# Patient Record
Sex: FEMALE | Race: AMERICAN INDIAN OR ALASKA NATIVE
[De-identification: names, ages, dates, MRNs, and addresses within clinical notes are randomized per-mention and may not be internally consistent; named-entity substitution may affect disease eponyms.]

---

## 2017-10-04 ENCOUNTER — HOSPITAL ENCOUNTER (EMERGENCY)
Dept: HOSPITAL 5 - ED | Age: 22
LOS: 1 days | Discharge: LEFT BEFORE DISCHARGE | End: 2017-10-05
Payer: SELF-PAY

## 2017-10-04 DIAGNOSIS — R10.9: Primary | ICD-10-CM

## 2017-10-04 DIAGNOSIS — Z53.21: ICD-10-CM

## 2017-10-04 PROCEDURE — 84702 CHORIONIC GONADOTROPIN TEST: CPT

## 2017-10-04 PROCEDURE — 36415 COLL VENOUS BLD VENIPUNCTURE: CPT

## 2017-10-04 PROCEDURE — 86850 RBC ANTIBODY SCREEN: CPT

## 2017-10-04 PROCEDURE — 86901 BLOOD TYPING SEROLOGIC RH(D): CPT

## 2017-10-04 PROCEDURE — 81001 URINALYSIS AUTO W/SCOPE: CPT

## 2017-10-04 PROCEDURE — 86900 BLOOD TYPING SEROLOGIC ABO: CPT

## 2017-10-04 PROCEDURE — 85025 COMPLETE CBC W/AUTO DIFF WBC: CPT

## 2017-10-05 VITALS — SYSTOLIC BLOOD PRESSURE: 117 MMHG | DIASTOLIC BLOOD PRESSURE: 72 MMHG

## 2017-10-05 LAB
BASOPHILS NFR BLD AUTO: 0.3 % (ref 0–1.8)
BILIRUB UR QL STRIP: (no result)
BLOOD UR QL VISUAL: (no result)
EOSINOPHIL NFR BLD AUTO: 0.3 % (ref 0–4.3)
HCT VFR BLD CALC: 36.6 % (ref 30.3–42.9)
HGB BLD-MCNC: 11.9 GM/DL (ref 10.1–14.3)
KETONES UR STRIP-MCNC: (no result) MG/DL
LEUKOCYTE ESTERASE UR QL STRIP: (no result)
MCH RBC QN AUTO: 28 PG (ref 28–32)
MCHC RBC AUTO-ENTMCNC: 33 % (ref 30–34)
MCV RBC AUTO: 85 FL (ref 79–97)
MUCOUS THREADS #/AREA URNS HPF: (no result) /HPF
NITRITE UR QL STRIP: (no result)
PH UR STRIP: 6 [PH] (ref 5–7)
PLATELET # BLD: 350 K/MM3 (ref 140–440)
RBC # BLD AUTO: 4.31 M/MM3 (ref 3.65–5.03)
RBC #/AREA URNS HPF: 2 /HPF (ref 0–6)
UROBILINOGEN UR-MCNC: < 2 MG/DL (ref ?–2)
WBC # BLD AUTO: 8.4 K/MM3 (ref 4.5–11)
WBC #/AREA URNS HPF: 1 /HPF (ref 0–6)

## 2018-05-27 ENCOUNTER — HOSPITAL ENCOUNTER (EMERGENCY)
Dept: HOSPITAL 5 - ED | Age: 23
Discharge: HOME | End: 2018-05-27
Payer: MEDICAID

## 2018-05-27 VITALS — SYSTOLIC BLOOD PRESSURE: 160 MMHG | DIASTOLIC BLOOD PRESSURE: 76 MMHG

## 2018-05-27 DIAGNOSIS — S41.151A: ICD-10-CM
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
## 2018-05-27 DIAGNOSIS — Z3A.16: ICD-10-CM

## 2018-05-27 DIAGNOSIS — O9A.212: Primary | ICD-10-CM
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
## 2018-05-27 PROCEDURE — 90471 IMMUNIZATION ADMIN: CPT

## 2018-05-27 PROCEDURE — 90715 TDAP VACCINE 7 YRS/> IM: CPT

## 2018-05-27 RX ADMIN — LIDOCAINE HYDROCHLORIDE ONE ML: 20 INJECTION, SOLUTION INFILTRATION; PERINEURAL at 20:53

## 2018-05-27 RX ADMIN — LIDOCAINE HYDROCHLORIDE ONE: 20 INJECTION, SOLUTION INFILTRATION; PERINEURAL at 20:54

## 2018-05-27 NOTE — EMERGENCY DEPARTMENT REPORT
ED Animal Bite HPI





- General


Chief Complaint: Animal Bite


Stated Complaint: DOG BITE


Time Seen by Provider: 18 19:01


Source: patient


Mode of arrival: Ambulatory


Limitations: No Limitations





- History of Present Illness


Initial Comments: 





This is a 22-year-old -American female presents with a dog bite.  

Patient reports she is 4 months pregnant and her personal dog bit her multiple 

times to both arms and under left breast.  Patient reports dog has been very 

aggressive since pregnancy.  He is currently up-to-date on vaccines.  For a 

friend was able removed from her but received multiple bites as well.  Last 

menstrual period was 2018.   A2.  Patient and spouse reports dog 

remains in home 90% of the time.  He is only outside when walked.  Do not think 

he has rabies.  Denies swelling, redness, and numbness or tingling.


MD Complaint: animal bite


-: This afternoon (1-2 hours ago)


Location: other (abrasions and puncture wound under left breast)


Left: Forearm (multiple puncture wounds to bilateral wrists and forearms), Right

: Forearm


Animal: dog


Animal Control Notified: No


Description: household pet, immunizations UTD


Mechanism: bite


Pain Description: sharp, constant


Severity scale (0 -10): 7


Context: unprovoked


Associated Symptoms: erythema, bleeding.  denies: discharge from wound, fever, 

chills, rash, loss of consciousness, cough, headache, diaphoresis, shortness of 

breath


Treatments Prior to Arrival: pressure





- Related Data


Patient Tetanus UTD: No


 Previous Rx's











 Medication  Instructions  Recorded  Last Taken  Type


 


Amoxicillin/Potassium Clav 1 each PO BID 5 Days #10 tablet 18 Unknown Rx





[Augmentin 875-125 Tablet]    











 Allergies











Allergy/AdvReac Type Severity Reaction Status Date / Time


 


codeine Allergy  Hives Verified 10/05/17 00:19


 


morphine Allergy  Hives Verified 18 17:46














ED Review of Systems


ROS: 


Stated complaint: DOG BITE


Other details as noted in HPI





Constitutional: denies: chills, fever


Respiratory: denies: cough, shortness of breath, wheezing


Cardiovascular: denies: chest pain, palpitations


Gastrointestinal: denies: abdominal pain, nausea, diarrhea


Musculoskeletal: denies: back pain, joint swelling, arthralgia


Skin: lesions (mode multiple puncture wounds to the bilateral wrist, forearms 

and under left breast).  denies: rash


Neurological: denies: headache, weakness, numbness, paresthesias


Psychiatric: denies: anxiety, depression





ED Past Medical Hx





- Past Medical History


Previous Medical History?: No


Additional medical history: MISCARRIAGE 2017





- Surgical History


Past Surgical History?: No





- Social History


Smoking Status: Never Smoker


Substance Use Type: None





- Medications


Home Medications: 


 Home Medications











 Medication  Instructions  Recorded  Confirmed  Last Taken  Type


 


Amoxicillin/Potassium Clav 1 each PO BID 5 Days #10 tablet 18  Unknown Rx





[Augmentin 875-125 Tablet]     














ED Physical Exam





- General


Limitations: No Limitations


General appearance: alert, in no apparent distress





- Respiratory


Respiratory exam: Present: normal lung sounds bilaterally.  Absent: respiratory 

distress





- Cardiovascular


Cardiovascular Exam: Present: regular rate, normal rhythm, normal heart sounds.

  Absent: systolic murmur, diastolic murmur, rubs, gallop





- GI/Abdominal


GI/Abdominal exam: Present: soft, normal bowel sounds





- Extremities Exam


Extremities exam: Present: normal inspection, full ROM, normal capillary refill





- Expanded Upper Extremity Exam


  ** Right


Shoulder Exam: Present: normal inspection, full ROM


Upper Arm exam: Present: full ROM, tenderness, abrasion (multiple abrasions to 

anterior forearm)


Elbow exam: Present: normal inspection, full ROM


Forearm Wrist exam: Present: tenderness, abrasion.  Absent: deformity, crepidus

, dislocation, tenderness over anatomical snuff box, pain with axial thumb 

loading


Hand Wrist exam: Present: normal inspection, full ROM


Neuro motor exam: Present: wrist extension intact, thumb opposition intact, 

thumb IP flexion intact, thumb adduction intact, fingers 2-5 abduction intact


Neurosensory exam: Present: radial nerve intact, ulnar nerve intact, median 

nerve intact


Vascular: Present: normal capillary refill, radial pulse (+2)





  ** Left


Shoulder Exam: Present: normal inspection, full ROM


Upper Arm exam: Present: normal inspection, full ROM


Elbow exam: Present: normal inspection, full ROM


Forearm Wrist exam: Present: full ROM, tenderness, abrasion (multiple abrasions 

anterior forearm), laceration (2 cm laceration to left wrist, bloody discharge, 

no surrounding cellulitis, tenderness).  Absent: ecchymosis, deformity, crepidus

, dislocation, erythema


Hand Wrist exam: Present: full ROM, tenderness, other


Neuro motor exam: Present: wrist extension intact, thumb opposition intact, 

thumb IP flexion intact, thumb adduction intact, fingers 2-5 abduction intact


Neurosensory exam: Present: radial nerve intact, ulnar nerve intact, median 

nerve intact


Vascular: Present: normal capillary refill, radial pulse (+2)





- Neurological Exam


Neurological exam: Present: alert, oriented X3, normal gait





- Psychiatric


Psychiatric exam: Present: normal affect, normal mood





- Skin


Skin exam: Present: warm, dry, normal color, other (puncture wound to left 

breast and 7 o'clock, bloody drainage, tender to touch, and no surrounding 

cellulitis).  Absent: intact, rash





ED Course


 Vital Signs











  18





  17:46 21:55


 


Temperature 98.4 F 


 


Pulse Rate 113 H 88


 


Respiratory  17





Rate  


 


Blood Pressure 160/76 


 


O2 Sat by Pulse 100 98





Oximetry  














- Laceration /Wound Repair


  ** Left Anterior Distal Arm


Wound Location: upper extremity


Wound Length (cm): 2


Wound's Depth, Shape: into muscle, linear


Wound Explored: no foreign body removed


Irrigated w/ Saline (ccs): 3


Betadine Prep?: Yes


Anesthesia: 1% Lidocaine


Volume Anesthetic (ccs): 1 (loose suture center wound)


Wound Repaired With: sutures


Suture Size/Type: 4:0


Number of Sutures: 1 (loosely closed)


Layer Closure?: No


Sterile Dressing Applied?: Yes


Critical care attestation.: 


If time is entered above; I have spent that time in minutes in the direct care 

of this critically ill patient, excluding procedure time.








ED Disposition


Clinical Impression: 


 Laceration





Dog bite of arm


Qualifiers:


 Encounter type: initial encounter Laterality: right Qualified Code(s): 

S41.151A - Open bite of right upper arm, initial encounter





Dog bite


Qualifiers:


 Encounter type: initial encounter Qualified Code(s): W54.0XXA - Bitten by dog, 

initial encounter





Disposition:  TO HOME OR SELFCARE


Is pt being admited?: No


Does the pt Need Aspirin: No


Condition: Stable


Instructions:  Animal Bite (ED)


Additional Instructions: 


Clean wounds daily with soap and water.


Follow-up with OB/GYN in 2-3 days.


Apply triple antibiotic ointment all wounds twice a day.


Complete antibiotic as prescribed.


Return to primary care provider or ER in 7-10 days for removal of suture.


Return to ER if red, swollen, foul discharge, or fever.





Prescriptions: 


Amoxicillin/Potassium Clav [Augmentin 875-125 Tablet] 1 each PO BID 5 Days #10 

tablet


Referrals: 


[facility] [Provider Group] - 3-5 Days


[facility] [Outside] - 3-5 Days


Print Language: ENGLISH